# Patient Record
Sex: FEMALE | Race: WHITE | NOT HISPANIC OR LATINO | Employment: UNEMPLOYED | URBAN - METROPOLITAN AREA
[De-identification: names, ages, dates, MRNs, and addresses within clinical notes are randomized per-mention and may not be internally consistent; named-entity substitution may affect disease eponyms.]

---

## 2021-10-03 ENCOUNTER — IMMUNIZATION (OUTPATIENT)
Dept: PRIMARY CARE CLINIC | Facility: CLINIC | Age: 26
End: 2021-10-03

## 2021-10-03 DIAGNOSIS — Z23 NEED FOR VACCINATION: Primary | ICD-10-CM

## 2021-10-03 PROCEDURE — 91300 COVID-19, MRNA, LNP-S, PF, 30 MCG/0.3 ML DOSE VACCINE: CPT | Mod: PBBFAC | Performed by: FAMILY MEDICINE

## 2021-10-03 PROCEDURE — 0002A COVID-19, MRNA, LNP-S, PF, 30 MCG/0.3 ML DOSE VACCINE: CPT | Mod: CV19,PBBFAC | Performed by: FAMILY MEDICINE

## 2025-02-17 ENCOUNTER — OFFICE VISIT (OUTPATIENT)
Dept: PRIMARY CARE CLINIC | Facility: CLINIC | Age: 30
End: 2025-02-17
Payer: COMMERCIAL

## 2025-02-17 ENCOUNTER — LAB VISIT (OUTPATIENT)
Dept: LAB | Facility: HOSPITAL | Age: 30
End: 2025-02-17
Attending: FAMILY MEDICINE
Payer: COMMERCIAL

## 2025-02-17 VITALS
DIASTOLIC BLOOD PRESSURE: 62 MMHG | TEMPERATURE: 99 F | SYSTOLIC BLOOD PRESSURE: 100 MMHG | WEIGHT: 128.63 LBS | OXYGEN SATURATION: 95 % | HEART RATE: 90 BPM | BODY MASS INDEX: 24.31 KG/M2

## 2025-02-17 DIAGNOSIS — Z76.89 ENCOUNTER TO ESTABLISH CARE: ICD-10-CM

## 2025-02-17 DIAGNOSIS — Z00.00 ROUTINE GENERAL MEDICAL EXAMINATION AT A HEALTH CARE FACILITY: Primary | ICD-10-CM

## 2025-02-17 DIAGNOSIS — Z00.00 ROUTINE GENERAL MEDICAL EXAMINATION AT A HEALTH CARE FACILITY: ICD-10-CM

## 2025-02-17 PROBLEM — O14.90 PRE-ECLAMPSIA: Status: ACTIVE | Noted: 2025-02-17

## 2025-02-17 LAB
ERYTHROCYTE [DISTWIDTH] IN BLOOD BY AUTOMATED COUNT: 12.5 % (ref 11.5–14.5)
ESTIMATED AVG GLUCOSE: 94 MG/DL (ref 68–131)
HBA1C MFR BLD: 4.9 % (ref 4–5.6)
HCT VFR BLD AUTO: 38.5 % (ref 37–48.5)
HGB BLD-MCNC: 12.4 G/DL (ref 12–16)
MCH RBC QN AUTO: 29.4 PG (ref 27–31)
MCHC RBC AUTO-ENTMCNC: 32.2 G/DL (ref 32–36)
MCV RBC AUTO: 91 FL (ref 82–98)
PLATELET # BLD AUTO: 149 K/UL (ref 150–450)
PMV BLD AUTO: 10.4 FL (ref 9.2–12.9)
RBC # BLD AUTO: 4.22 M/UL (ref 4–5.4)
WBC # BLD AUTO: 5.3 K/UL (ref 3.9–12.7)

## 2025-02-17 PROCEDURE — 36415 COLL VENOUS BLD VENIPUNCTURE: CPT | Mod: PN | Performed by: FAMILY MEDICINE

## 2025-02-17 PROCEDURE — 80053 COMPREHEN METABOLIC PANEL: CPT | Performed by: FAMILY MEDICINE

## 2025-02-17 PROCEDURE — 84439 ASSAY OF FREE THYROXINE: CPT | Performed by: FAMILY MEDICINE

## 2025-02-17 PROCEDURE — 84443 ASSAY THYROID STIM HORMONE: CPT | Performed by: FAMILY MEDICINE

## 2025-02-17 PROCEDURE — 85027 COMPLETE CBC AUTOMATED: CPT | Performed by: FAMILY MEDICINE

## 2025-02-17 PROCEDURE — 80061 LIPID PANEL: CPT | Performed by: FAMILY MEDICINE

## 2025-02-17 PROCEDURE — 83036 HEMOGLOBIN GLYCOSYLATED A1C: CPT | Performed by: FAMILY MEDICINE

## 2025-02-17 NOTE — PROGRESS NOTES
Chief Complaint  Chief Complaint   Patient presents with    [facility]     Pt presents to clinic to [facility].       HPI  Yaima Dean is a 29 y.o. female with multiple medical diagnoses as listed in the medical history and problem list that presents for  in person visit. New patient to me and to [facility].     History of Present Illness    CHIEF COMPLAINT:  - Patient presents for an initial visit to [facility] with a new primary care physician.    HPI:  Patient is a 29-year-old female establishing care with Dr. Murrell as her new primary care physician. She has a history of preeclampsia with her first pregnancy, while her most recent pregnancy was uncomplicated, resulting in a significantly larger baby. She underwent sinus surgery for a deviated septum in high school. Recently, she successfully completed her first 10k race, progressing from her previous 5k running experience. She previously participated in [employer] before having children but now focuses on running due to time constraints. She occasionally runs with a running stroller but has difficulty with her 3-year-old attempting to exit the stroller. She plans to have her Nexplanon birth control removed in May, intending to conceive a third child by the end of the year.    She denies any current health concerns, trouble with sleep, anxiety, depression, tremors, migraines, headaches, joint issues, or problems with bowel movements. She requires contact lenses but denies any other vision problems.    MEDICATIONS:  - Nexplanon (birth control implant)    PMH:  - Preeclampsia: With first pregnancy  - Last Pap: January  - Contraception: current Nexplanon  - G2  - P2  - Lactation History: Yes    FAMILY HISTORY:  - Mother: Parkinson's disease  - Oldest sister: Possible Crohn's disease (patient uncertain)    RECENT/REMOTE SURGICAL HISTORY:  - Deviated septum surgery: In high school    SOCIAL HISTORY:  - Alcohol: Occasional, 1 glass of wine a  week  Smoking: Never smoker  - Occupation: Employed    Marital status:  to Ismael    HEALTH MAINTENAINCE:  - Tdap received on November 20th, 2021  - COVID-19 vaccine: received 2 doses (1 in an unspecified location and 1 in [place])         [facility]    2/10/2025  9:50 AM CST - Filed by Patient   This questionnaire should take approximately 5 to 10 minutes to complete.  To begin, press Let's Begin and then press Continue. Let's Begin   On average, how many days per week do you engage in moderate to strenuous exercise (like a brisk walk)? 4 days   On average, how many minutes do you engage in exercise at this level? 30 min   Do you feel stress - tense, restless, nervous, or anxious, or unable to sleep at night because your mind is troubled all the time - these days? Only a little   How often do you feel lonely or isolated from those around you? Rarely   How often do you need to have someone help you when you read instructions, pamphlets, or other written material from your doctor or pharmacy? Never   How hard is it for you to pay for the very basics like food, housing, medical care, and heating? Not hard at all   In the past 12 months has the electric, gas, oil, or water company threatened to shut off services in your home? No   Within the past 12 months, you worried that your food would run out before you got the money to buy more. Never true   Within the past 12 months, the food you bought just didn't last and you didn't have money to get more. Never true   Has the lack of transportation kept you from medical appointments, meetings, work or from getting things needed for daily living? No   In the last 12 months, was there a time when you were not able to pay the mortgage or rent on time? No   In the last 12 months, was there a time when you did not have a steady place to sleep or slept in a shelter (including now)? No   How often do you have a drink containing alcohol? 2-4 times a month   How many  drinks containing alcohol do you have on a typical day when you are drinking? 1 or 2   How often do you have six or more drinks on one occasion? Never            Pmh, Psh, Family Hx, Social Hx, HM updated in [employer] today.  Mother is Shaneka, LCSW with [facility].     Review of Systems   Constitutional:  Negative for chills, fatigue and fever.   HENT:  Negative for ear pain and trouble swallowing.    Eyes:  Negative for pain and visual disturbance.   Respiratory:  Negative for cough and shortness of breath.    Cardiovascular:  Negative for chest pain and leg swelling.   Gastrointestinal:  Negative for abdominal pain, blood in stool, nausea and vomiting.   Endocrine: Negative for cold intolerance and heat intolerance.   Genitourinary:  Negative for dysuria and frequency.   Musculoskeletal:  Negative for arthralgias, joint swelling, myalgias and neck pain.   Skin:  Negative for color change and rash.   Neurological:  Negative for dizziness and headaches.   Psychiatric/Behavioral:  Negative for behavioral problems and sleep disturbance.         Objective:     Vitals:    02/17/25 0803   BP: 100/62   Pulse: 90   Temp: 98.5 °F (36.9 °C)   TempSrc: Tympanic   SpO2: 95%   Weight: 58.3 kg (128 lb 10.2 oz)     Wt Readings from Last 10 Encounters:   02/17/25 58.3 kg (128 lb 10.2 oz)   11/15/23 56.7 kg (125 lb)   04/28/23 65.8 kg (145 lb)   03/31/23 63.5 kg (140 lb)   03/03/23 61.2 kg (135 lb)   02/03/23 56.2 kg (124 lb)   01/04/23 58.5 kg (129 lb)   12/29/21 62.6 kg (138 lb)   11/08/21 70.3 kg (155 lb)   10/25/21 68.9 kg (152 lb)     Physical Exam            Physical Exam  Vitals reviewed.   Constitutional:       Appearance: Normal appearance. She is well-developed and normal weight.   HENT:      Head: Normocephalic and atraumatic.      Right Ear: Tympanic membrane and external ear normal.      Left Ear: Tympanic membrane and external ear normal.      Nose: Nose normal.      Mouth/Throat:      Mouth: Mucous membranes  are moist.      Pharynx: Oropharynx is clear.   Eyes:      Conjunctiva/sclera: Conjunctivae normal.      Pupils: Pupils are equal, round, and reactive to light.   Neck:      Thyroid: No thyromegaly.   Cardiovascular:      Rate and Rhythm: Normal rate and regular rhythm.      Pulses: Normal pulses.      Heart sounds: Normal heart sounds. No murmur heard.     No friction rub. No gallop.   Pulmonary:      Effort: Pulmonary effort is normal. No respiratory distress.      Breath sounds: Normal breath sounds. No wheezing or rales.   Abdominal:      General: Bowel sounds are normal. There is no distension.      Palpations: Abdomen is soft.      Tenderness: There is no abdominal tenderness. There is no rebound.   Musculoskeletal:         General: Normal range of motion.      Cervical back: Normal range of motion and neck supple.   Lymphadenopathy:      Cervical: No cervical adenopathy.   Skin:     General: Skin is warm and dry.      Findings: No rash.   Neurological:      General: No focal deficit present.      Mental Status: She is alert and oriented to person, place, and time. Mental status is at baseline.      Motor: No weakness.      Gait: Gait normal.   Psychiatric:         Attention and Perception: Attention and perception normal.         Mood and Affect: Mood and affect normal.         Speech: Speech normal.         Behavior: Behavior normal.         Thought Content: Thought content normal.         Cognition and Memory: Cognition and memory normal.         Judgment: Judgment normal.         Assessment:   LABS:   Lab Results   Component Value Date    WBC 11.77 04/10/2023    HGB 11.9 (L) 04/10/2023    HCT 35.2 (L) 04/10/2023     04/10/2023    ALT 29 11/16/2021    AST 38 11/16/2021    CREATININE 0.8 11/16/2021    BUN 15 11/16/2021       Plan:   Assessment & Plan    Z00.00 Routine general medical exam at a health care facility  Z76.89 Encounter to establish care    IMPRESSION:  - Established care for 29-year-old  female with history of preeclampsia in prior pregnancy  - Reviewed family history, including mother with Parkinson's and sister with potential Crohn's disease  - Assessed current health status and lifestyle  - Planned routine health maintenance, including cholesterol screening and diabetes check  - Requested records from previous provider for continuity of care    PLAN SUMMARY:  - Ordered lipid panel, thyroid screening, comprehensive metabolic panel, complete blood count, and hemoglobin A1C  - Advise purchasing proper running shoes for physical activities  - Sign pink form for release of Pap smear records from birthing center  - Recommend starting prenatal vitamins before attempting pregnancy  - Continue regular exercise routine, including running  - Check out with  for lab work  - Contact office in a few days if lab results require intervention or explanation    ROUTINE GENERAL MEDICAL EXAMINATION AT A HEALTH CARE FACILITY:  - Educated patient about routine health maintenance screenings, including cholesterol and diabetes checks.  - Discussed frequency of cholesterol screenings based on individual risk factors.  - Ordered lipid panel, thyroid screening, comprehensive metabolic panel, complete blood count, and hemoglobin A1C for routine health maintenance and diabetes screening.  - Informed patient about automatic release of lab results through the patient portal.  - Instructed patient to check out with  for lab work and to contact the office in a few days if any results require intervention or explanation.    ENCOUNTER TO ESTABLISH CARE:  - Instructed patient to sign the pink form for release of Pap smear records from the birthing center.  - Recommend starting prenatal vitamins before attempting pregnancy.    PHYSICAL ACTIVITY:  - Recommend continuing regular exercise routine, including running.  - Advise purchasing proper running shoes for optimal support during physical  activities.    FOLLOW UP:  - 1 year for wellness exam/ annual       Yaima was seen today for establish care.    Diagnoses and all orders for this visit:    Routine general medical examination at a health care facility  -     Hemoglobin A1C; Future  -     CBC Without Differential; Future  -     Comprehensive Metabolic Panel; Future  -     TSH; Future  -     T4, Free; Future  -     Lipid Panel; Future    Encounter to establish care              Follow-up: Follow up in about 1 year (around 2/17/2026) for physical with Dr HARRIS.    I spent a total of  30     minutes face to face and non-face to face on the date of this visit.This includes time preparing to see the patient (eg, review of tests, notes), obtaining and/or reviewing additional history from an independent historian and/or outside medical records, documenting clinical information in the electronic health record, independently interpreting results and/or communicating results to the patient/family/caregiver, or care coordinator.  Visit today included increased complexity associated with the care of the episodic problem addressed and managing the longitudinal care of the patient due to the serious and/or complex managed problem(s).    This note was generated with the assistance of ambient listening technology. Verbal consent was obtained by the patient and accompanying visitor(s) for the recording of patient appointment to facilitate this note. I attest to having reviewed and edited the generated note for accuracy, though some syntax or spelling errors may persist. Please contact the author of this note for any clarification.       There are no Patient Instructions on file for this visit.

## 2025-02-18 LAB
ALBUMIN SERPL BCP-MCNC: 3.9 G/DL (ref 3.5–5.2)
ALP SERPL-CCNC: 48 U/L (ref 40–150)
ALT SERPL W/O P-5'-P-CCNC: 14 U/L (ref 10–44)
ANION GAP SERPL CALC-SCNC: 13 MMOL/L (ref 8–16)
AST SERPL-CCNC: 29 U/L (ref 10–40)
BILIRUB SERPL-MCNC: 0.5 MG/DL (ref 0.1–1)
BUN SERPL-MCNC: 8 MG/DL (ref 6–20)
CALCIUM SERPL-MCNC: 9.5 MG/DL (ref 8.7–10.5)
CHLORIDE SERPL-SCNC: 105 MMOL/L (ref 95–110)
CHOLEST SERPL-MCNC: 140 MG/DL (ref 120–199)
CHOLEST/HDLC SERPL: 2.8 {RATIO} (ref 2–5)
CO2 SERPL-SCNC: 22 MMOL/L (ref 23–29)
CREAT SERPL-MCNC: 0.8 MG/DL (ref 0.5–1.4)
EST. GFR  (NO RACE VARIABLE): >60 ML/MIN/1.73 M^2
GLUCOSE SERPL-MCNC: 78 MG/DL (ref 70–110)
HDLC SERPL-MCNC: 50 MG/DL (ref 40–75)
HDLC SERPL: 35.7 % (ref 20–50)
LDLC SERPL CALC-MCNC: 75.8 MG/DL (ref 63–159)
NONHDLC SERPL-MCNC: 90 MG/DL
POTASSIUM SERPL-SCNC: 4.4 MMOL/L (ref 3.5–5.1)
PROT SERPL-MCNC: 6.9 G/DL (ref 6–8.4)
SODIUM SERPL-SCNC: 140 MMOL/L (ref 136–145)
T4 FREE SERPL-MCNC: 0.81 NG/DL (ref 0.71–1.51)
TRIGL SERPL-MCNC: 71 MG/DL (ref 30–150)
TSH SERPL DL<=0.005 MIU/L-ACNC: 1.99 UIU/ML (ref 0.4–4)

## 2025-02-25 ENCOUNTER — RESULTS FOLLOW-UP (OUTPATIENT)
Dept: PRIMARY CARE CLINIC | Facility: CLINIC | Age: 30
End: 2025-02-25

## 2025-03-06 ENCOUNTER — PATIENT OUTREACH (OUTPATIENT)
Dept: ADMINISTRATIVE | Facility: HOSPITAL | Age: 30
End: 2025-03-06
Payer: COMMERCIAL